# Patient Record
Sex: MALE | Race: BLACK OR AFRICAN AMERICAN | NOT HISPANIC OR LATINO | Employment: UNEMPLOYED | URBAN - METROPOLITAN AREA
[De-identification: names, ages, dates, MRNs, and addresses within clinical notes are randomized per-mention and may not be internally consistent; named-entity substitution may affect disease eponyms.]

---

## 2022-11-23 ENCOUNTER — HOSPITAL ENCOUNTER (EMERGENCY)
Facility: HOSPITAL | Age: 47
Discharge: HOME | End: 2022-11-23
Attending: EMERGENCY MEDICINE

## 2022-11-23 VITALS
DIASTOLIC BLOOD PRESSURE: 92 MMHG | HEIGHT: 71 IN | OXYGEN SATURATION: 98 % | RESPIRATION RATE: 18 BRPM | BODY MASS INDEX: 33.6 KG/M2 | HEART RATE: 98 BPM | WEIGHT: 240 LBS | SYSTOLIC BLOOD PRESSURE: 141 MMHG | TEMPERATURE: 98 F

## 2022-11-23 DIAGNOSIS — L03.019 PARONYCHIA OF FINGER, UNSPECIFIED LATERALITY: Primary | ICD-10-CM

## 2022-11-23 DIAGNOSIS — R03.0 ELEVATED BLOOD PRESSURE READING: ICD-10-CM

## 2022-11-23 LAB
ALBUMIN SERPL BCP-MCNC: 4.6 G/DL (ref 3.5–5.2)
ALP SERPL-CCNC: 51 U/L (ref 55–135)
ALT SERPL W/O P-5'-P-CCNC: 48 U/L (ref 10–44)
ANION GAP SERPL CALC-SCNC: 9 MMOL/L (ref 8–16)
AST SERPL-CCNC: 30 U/L (ref 10–40)
BASOPHILS # BLD AUTO: 0.03 K/UL (ref 0–0.2)
BASOPHILS NFR BLD: 0.7 % (ref 0–1.9)
BILIRUB SERPL-MCNC: 1.2 MG/DL (ref 0.1–1)
BUN SERPL-MCNC: 13 MG/DL (ref 6–20)
CALCIUM SERPL-MCNC: 9.2 MG/DL (ref 8.7–10.5)
CHLORIDE SERPL-SCNC: 98 MMOL/L (ref 95–110)
CO2 SERPL-SCNC: 25 MMOL/L (ref 23–29)
CREAT SERPL-MCNC: 1.2 MG/DL (ref 0.5–1.4)
DIFFERENTIAL METHOD: NORMAL
EOSINOPHIL # BLD AUTO: 0.1 K/UL (ref 0–0.5)
EOSINOPHIL NFR BLD: 3 % (ref 0–8)
ERYTHROCYTE [DISTWIDTH] IN BLOOD BY AUTOMATED COUNT: 12.3 % (ref 11.5–14.5)
EST. GFR  (NO RACE VARIABLE): >60 ML/MIN/1.73 M^2
GLUCOSE SERPL-MCNC: 106 MG/DL (ref 70–110)
HCT VFR BLD AUTO: 46.4 % (ref 40–54)
HGB BLD-MCNC: 15.9 G/DL (ref 14–18)
IMM GRANULOCYTES # BLD AUTO: 0.01 K/UL (ref 0–0.04)
IMM GRANULOCYTES NFR BLD AUTO: 0.2 % (ref 0–0.5)
LYMPHOCYTES # BLD AUTO: 1.5 K/UL (ref 1–4.8)
LYMPHOCYTES NFR BLD: 34.4 % (ref 18–48)
MCH RBC QN AUTO: 29.9 PG (ref 27–31)
MCHC RBC AUTO-ENTMCNC: 34.3 G/DL (ref 32–36)
MCV RBC AUTO: 87 FL (ref 82–98)
MONOCYTES # BLD AUTO: 0.4 K/UL (ref 0.3–1)
MONOCYTES NFR BLD: 10.3 % (ref 4–15)
NEUTROPHILS # BLD AUTO: 2.2 K/UL (ref 1.8–7.7)
NEUTROPHILS NFR BLD: 51.4 % (ref 38–73)
NRBC BLD-RTO: 0 /100 WBC
PLATELET # BLD AUTO: 216 K/UL (ref 150–450)
PMV BLD AUTO: 11 FL (ref 9.2–12.9)
POTASSIUM SERPL-SCNC: 4 MMOL/L (ref 3.5–5.1)
PROT SERPL-MCNC: 9 G/DL (ref 6–8.4)
RBC # BLD AUTO: 5.31 M/UL (ref 4.6–6.2)
SODIUM SERPL-SCNC: 132 MMOL/L (ref 136–145)
WBC # BLD AUTO: 4.27 K/UL (ref 3.9–12.7)

## 2022-11-23 PROCEDURE — 99283 EMERGENCY DEPT VISIT LOW MDM: CPT | Mod: 25

## 2022-11-23 PROCEDURE — 85025 COMPLETE CBC W/AUTO DIFF WBC: CPT | Performed by: EMERGENCY MEDICINE

## 2022-11-23 PROCEDURE — 80053 COMPREHEN METABOLIC PANEL: CPT | Performed by: EMERGENCY MEDICINE

## 2022-11-23 PROCEDURE — 10060 I&D ABSCESS SIMPLE/SINGLE: CPT

## 2022-11-23 PROCEDURE — 25000003 PHARM REV CODE 250: Performed by: EMERGENCY MEDICINE

## 2022-11-23 RX ORDER — AMLODIPINE BESYLATE 5 MG/1
5 TABLET ORAL DAILY
Qty: 30 TABLET | Refills: 11 | Status: SHIPPED | OUTPATIENT
Start: 2022-11-23 | End: 2023-11-23

## 2022-11-23 RX ORDER — MUPIROCIN 20 MG/G
1 OINTMENT TOPICAL
Status: COMPLETED | OUTPATIENT
Start: 2022-11-23 | End: 2022-11-23

## 2022-11-23 RX ORDER — LIDOCAINE HYDROCHLORIDE 10 MG/ML
10 INJECTION, SOLUTION EPIDURAL; INFILTRATION; INTRACAUDAL; PERINEURAL
Status: DISCONTINUED | OUTPATIENT
Start: 2022-11-23 | End: 2022-11-23 | Stop reason: HOSPADM

## 2022-11-23 RX ORDER — SULFAMETHOXAZOLE AND TRIMETHOPRIM 800; 160 MG/1; MG/1
1 TABLET ORAL 2 TIMES DAILY
Qty: 20 TABLET | Refills: 0 | Status: SHIPPED | OUTPATIENT
Start: 2022-11-23 | End: 2022-12-03

## 2022-11-23 RX ADMIN — MUPIROCIN 22 G: 20 OINTMENT TOPICAL at 02:11

## 2022-11-23 NOTE — ED PROVIDER NOTES
Encounter Date: 11/23/2022       History     Chief Complaint   Patient presents with    Arm Injury     Pt reports to ED with swelling to right forearm and right pointer finger. Pt reports hitting it on a metal spring.     47-year-old male presents emergency department with complaint of pain to the right forearm, and right index finger.  Patient denies any specific trauma he states that his finger hurts and now has caused pain that radiates up his right forearm.  Pain is increased with touching finger or with movement.  Denies any associated fever.  Patient is noted to be hypertensive denies any chest pain shortness of breath blurry vision or headaches he does admit that he has a history of hypertension and is noncompliant with any medications    Review of patient's allergies indicates:   Allergen Reactions    Penicillins      No past medical history on file.  No past surgical history on file.  No family history on file.  Social History     Tobacco Use    Smoking status: Never   Substance Use Topics    Alcohol use: Yes     Comment: occasional    Drug use: No     Review of Systems   Constitutional:  Negative for fever.   HENT: Negative.     Cardiovascular: Negative.    Gastrointestinal: Negative.    Genitourinary: Negative.    Musculoskeletal:         Tenderness to the right index finger   Skin:  Positive for wound.   Hematological: Negative.    Psychiatric/Behavioral: Negative.     All other systems reviewed and are negative.    Physical Exam     Initial Vitals [11/23/22 1415]   BP Pulse Resp Temp SpO2   (!) 214/113 94 18 98.1 °F (36.7 °C) 98 %      MAP       --         Physical Exam    Nursing note and vitals reviewed.  Constitutional: He appears well-developed and well-nourished.   HENT:   Head: Normocephalic and atraumatic.   Cardiovascular:  Normal rate, regular rhythm, normal heart sounds and intact distal pulses.           Pulmonary/Chest: Breath sounds normal.   Abdominal: Abdomen is soft. Bowel sounds are  normal.   Musculoskeletal:         General: Tenderness present. Normal range of motion.      Comments: Paronychia noted to the right index finger     Neurological: He is alert and oriented to person, place, and time. He has normal strength.   Skin:   Paronychia noted to the right index finger       ED Course   I & D - Incision and Drainage    Date/Time: 11/23/2022 4:08 PM  Location procedure was performed: [facility] EMERGENCY DEPARTMENT  Performed by: KAROLYN Gray  Authorized by: Miller Dillard MD   Type: abscess  Body area: upper extremity  Location details: right index finger  Anesthesia: digital block    Anesthesia:  Local Anesthetic: lidocaine 1% without epinephrine  Scalpel size: 11  Incision type: single straight  Drainage: pus  Drainage amount: moderate  Wound treatment: wound left open  Complications: No      Labs Reviewed   COMPREHENSIVE METABOLIC PANEL - Abnormal; Notable for the following components:       Result Value    Sodium 132 (*)     Total Protein 9.0 (*)     Total Bilirubin 1.2 (*)     Alkaline Phosphatase 51 (*)     ALT 48 (*)     All other components within normal limits   CBC W/ AUTO DIFFERENTIAL          Imaging Results    None          Medications   LIDOcaine (PF) 10 mg/ml (1%) injection 100 mg (has no administration in time range)   mupirocin 2 % ointment 22 g (22 g Topical (Top) Given 11/23/22 1449)     Medical Decision Making:   Initial Assessment:   47-year-old male presents emergency department with complaint of pain to the right forearm, and right index finger.  Patient denies any specific trauma he states that his finger hurts and now has caused pain that radiates up his right forearm.  Pain is increased with touching finger or with movement.  Denies any associated fever.  Patient is noted to be hypertensive denies any chest pain shortness of breath blurry vision or headaches he does admit that he has a history of hypertension and is noncompliant with any  medications    Differential Diagnosis:   Considerations include paronychia, cellulitis, felon,  ED Management:  Patient presents to the emergency department with right index finger that radiates up to his right forearm.  Patient has had no fever denies any known trauma he does have a paronychia which was drained successfully with incision with copious amounts of purulent drainage patient will be placed on Bactrim and Bactroban ointment.  During the patient's visit he was also noted to be hypertensive however asymptomatic he does have a history of hypertension reports that he is noncompliant with medications he is not sure what he is supposed to take.  Patient's blood pressure did improve on its own however he is still hypertensive he will be started on Norvasc instructed follow up with [facility] in next 2 days for blood pressure recheck                        Clinical Impression:   Final diagnoses:  [L03.019] Paronychia of finger, unspecified laterality (Primary)  [R03.0] Elevated blood pressure reading      ED Disposition Condition    Discharge Stable          ED Prescriptions       Medication Sig Dispense Start Date End Date Auth. Provider    sulfamethoxazole-trimethoprim 800-160mg (BACTRIM DS) 800-160 mg Tab Take 1 tablet by mouth 2 (two) times daily. for 10 days 20 tablet 11/23/2022 12/3/2022 KAROLYN Gray    amLODIPine (NORVASC) 5 MG tablet Take 1 tablet (5 mg total) by mouth once daily. 30 tablet 11/23/2022 11/23/2023 KAROLYN Gray          Follow-up Information    None          KAROLYN Gray  11/23/22 [id number]

## 2022-11-23 NOTE — DISCHARGE INSTRUCTIONS
Take medications as directed   Follow-up as directed   [facility] in next 2 days for blood pressure recheck and wound check  Return if condition becomes worse for any concerns